# Patient Record
Sex: FEMALE
[De-identification: names, ages, dates, MRNs, and addresses within clinical notes are randomized per-mention and may not be internally consistent; named-entity substitution may affect disease eponyms.]

---

## 2020-08-27 ENCOUNTER — HOSPITAL ENCOUNTER (OUTPATIENT)
Dept: HOSPITAL 5 - CT | Age: 55
Discharge: HOME | End: 2020-08-27
Attending: INTERNAL MEDICINE
Payer: COMMERCIAL

## 2020-08-27 DIAGNOSIS — G93.89: Primary | ICD-10-CM

## 2020-08-27 DIAGNOSIS — I63.9: ICD-10-CM

## 2020-08-27 LAB — BUN SERPL-MCNC: 11 MG/DL (ref 7–17)

## 2020-08-27 PROCEDURE — 36415 COLL VENOUS BLD VENIPUNCTURE: CPT

## 2020-08-27 PROCEDURE — 82565 ASSAY OF CREATININE: CPT

## 2020-08-27 PROCEDURE — 70470 CT HEAD/BRAIN W/O & W/DYE: CPT

## 2020-08-27 PROCEDURE — 84520 ASSAY OF UREA NITROGEN: CPT

## 2020-08-27 NOTE — CAT SCAN REPORT
CT BRAIN: 8/27/2020



INDICATION / CLINICAL INFORMATION:

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



COMPARISON: 

None available.



FINDINGS:



BRAIN/INTRACRANIAL STRUCTURES: Unenhanced and enhanced CT images of the brain were obtained. No previ
ous studies are available for comparison.



The patient has had prior right frontotemporal craniotomy. 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
egion of the right middle cerebral artery. A metallic density is also seen in the vicinity of the rig
ht lateral ambient cistern. This is of uncertain etiology. There is extensive encephalomalacia in the
 right frontal lobe. There is also extensive encephalomalacia in the right cerebellar hemisphere, wit
h evidence of prior right suboccipital craniectomy.



There are a few nodular densities associated with the area of encephalomalacia in the right cerebella
r hemisphere. 3 slightly hyperdense nonenhancing structures, measuring approximately 1 to 1.5 cm are 
present associated with the area of encephalomalacia. History of uncertain etiology. Correlation with
 details of clinical information and probably comparison with prior studies would be necessary for fu
rther evaluation of these nodular densities of uncertain etiology.



There is no definite evidence of acute abnormality or traumatic injury.





EXTRACRANIAL STRUCTURES: Unremarkable.







IMPRESSION:

 Extensive postoperative changes as detailed above. Apparently nonenhancing nodular densities associa
clarisse with extensive right cerebellar encephalomalacia and postoperative change. Comparison with prior 
studies recommended for further evaluation.









All CT scans at this location are performed using dose reduction to ALARA by means of automated expos
ure control.



Signer Name: Jake Gross MD 

Signed: 8/27/2020 4:37 PM

Workstation Name: [id number]